# Patient Record
Sex: FEMALE | Race: WHITE | NOT HISPANIC OR LATINO
[De-identification: names, ages, dates, MRNs, and addresses within clinical notes are randomized per-mention and may not be internally consistent; named-entity substitution may affect disease eponyms.]

---

## 2019-03-22 PROBLEM — Z00.00 ENCOUNTER FOR PREVENTIVE HEALTH EXAMINATION: Status: ACTIVE | Noted: 2019-03-22

## 2019-03-26 ENCOUNTER — RECORD ABSTRACTING (OUTPATIENT)
Age: 64
End: 2019-03-26

## 2019-03-26 DIAGNOSIS — M77.9 ENTHESOPATHY, UNSPECIFIED: ICD-10-CM

## 2019-03-26 DIAGNOSIS — Z82.61 FAMILY HISTORY OF ARTHRITIS: ICD-10-CM

## 2019-03-26 DIAGNOSIS — Z85.850 PERSONAL HISTORY OF MALIGNANT NEOPLASM OF THYROID: ICD-10-CM

## 2019-03-26 DIAGNOSIS — M79.7 FIBROMYALGIA: ICD-10-CM

## 2019-03-26 DIAGNOSIS — M53.82 OTHER SPECIFIED DORSOPATHIES, CERVICAL REGION: ICD-10-CM

## 2019-03-26 DIAGNOSIS — M53.1 CERVICOBRACHIAL SYNDROME: ICD-10-CM

## 2019-03-26 DIAGNOSIS — M70.51 OTHER BURSITIS OF KNEE, RIGHT KNEE: ICD-10-CM

## 2019-03-26 DIAGNOSIS — M22.40 CHONDROMALACIA PATELLAE, UNSPECIFIED KNEE: ICD-10-CM

## 2019-03-26 DIAGNOSIS — G57.62 LESION OF PLANTAR NERVE, LEFT LOWER LIMB: ICD-10-CM

## 2019-03-26 DIAGNOSIS — Z78.9 OTHER SPECIFIED HEALTH STATUS: ICD-10-CM

## 2019-03-26 DIAGNOSIS — Z87.19 PERSONAL HISTORY OF OTHER DISEASES OF THE DIGESTIVE SYSTEM: ICD-10-CM

## 2019-03-26 DIAGNOSIS — M72.2 PLANTAR FASCIAL FIBROMATOSIS: ICD-10-CM

## 2019-03-26 DIAGNOSIS — Z80.9 FAMILY HISTORY OF MALIGNANT NEOPLASM, UNSPECIFIED: ICD-10-CM

## 2019-03-26 DIAGNOSIS — M23.90 UNSPECIFIED INTERNAL DERANGEMENT OF UNSPECIFIED KNEE: ICD-10-CM

## 2019-03-26 DIAGNOSIS — M75.52 BURSITIS OF LEFT SHOULDER: ICD-10-CM

## 2019-03-26 DIAGNOSIS — S93.609A UNSPECIFIED SPRAIN OF UNSPECIFIED FOOT, INITIAL ENCOUNTER: ICD-10-CM

## 2019-03-26 DIAGNOSIS — M25.579 PAIN IN UNSPECIFIED ANKLE AND JOINTS OF UNSPECIFIED FOOT: ICD-10-CM

## 2019-03-26 DIAGNOSIS — R25.2 CRAMP AND SPASM: ICD-10-CM

## 2019-03-26 DIAGNOSIS — Z87.440 PERSONAL HISTORY OF URINARY (TRACT) INFECTIONS: ICD-10-CM

## 2019-03-26 DIAGNOSIS — M25.529 PAIN IN UNSPECIFIED ELBOW: ICD-10-CM

## 2019-03-27 ENCOUNTER — APPOINTMENT (OUTPATIENT)
Dept: PHYSICAL MEDICINE AND REHAB | Facility: CLINIC | Age: 64
End: 2019-03-27
Payer: COMMERCIAL

## 2019-03-27 VITALS — HEIGHT: 63 IN | BODY MASS INDEX: 19.31 KG/M2 | WEIGHT: 109 LBS

## 2019-03-27 PROCEDURE — 99204 OFFICE O/P NEW MOD 45 MIN: CPT | Mod: 25

## 2019-03-27 PROCEDURE — 20552 NJX 1/MLT TRIGGER POINT 1/2: CPT

## 2019-03-27 RX ORDER — LEVOTHYROXINE SODIUM 75 UG/1
75 TABLET ORAL
Refills: 0 | Status: COMPLETED | COMMUNITY
End: 2019-03-27

## 2019-03-27 RX ORDER — CYCLOSPORINE 0.5 MG/ML
0.05 EMULSION OPHTHALMIC
Refills: 0 | Status: COMPLETED | COMMUNITY
End: 2019-03-27

## 2019-03-27 RX ORDER — IBUPROFEN 600 MG/1
600 TABLET, FILM COATED ORAL 3 TIMES DAILY
Refills: 0 | Status: COMPLETED | COMMUNITY
End: 2019-03-27

## 2019-03-27 RX ORDER — DICLOFENAC SODIUM 10 MG/G
1 GEL TOPICAL
Refills: 0 | Status: COMPLETED | COMMUNITY
End: 2019-03-27

## 2019-03-28 NOTE — HISTORY OF PRESENT ILLNESS
[FreeTextEntry1] : Patient has had pain in the right buttock and tightness in the hamstrings for a month. The pain is variable. It can be sharp, stabbing, and achy. Pain level ranges from 5 to 8. Aggravating factors include quick movements, walking fast, and sitting on a hard chair. Stretching alleviates the pain.

## 2019-03-28 NOTE — ASSESSMENT
[FreeTextEntry1] : Trigger point injection given to right glut, if no improvement patient may call back for Medrol Dose Neymar prescription to be sent

## 2019-03-28 NOTE — PROCEDURE
[de-identified] : Patient has demonstrated limited relief from NSAIDS, rest, and PT.\par  After discussion of the risks and benefits, she elected to proceed with a trigger point injection into the right gluteus sami. I confirmed no prior adverse reactions, no active infections, and no relevant allergies. \par \par The skin was prepped in the usual sterile manner. \par The sites were injected with local anesthetic followed by local needling. The injection was completed without complication and a bandage was applied. \par She tolerated the procedure well and was given post-injection instructions. \par \par Cold Tx x 48 hours, analgesics prn. Medications: 1 ml of 1% Lidocaine per site

## 2019-03-28 NOTE — PHYSICAL EXAM
[FreeTextEntry1] : Skin\par Lumbosacral Spine: normal skin\par \par Lumbar Spine\par Inspection: normal alignment\par Bony Palpation of the Lumbar Spine: no tenderness of the spinous process\par Bony Palpation of the Right Hip: tenderness of the sciatic notch, no tenderness of the SI joint\par Bony Palpation of the Left Hip: no tenderness of the sciatic notch, no tenderness of the SI joint\par Soft Tissue Palpation on the Right: tenderness on palpation of the lumbar paraspinals\par Soft Tissue Palpation on the Left: tenderness on palpation of the lumbar paraspinals\par Active Range of Motion: decreased ROM secondary to pain with flexion\par \par Motor Strength\par L2 Motor Strength on the Right: hip flexion iliopsoas 5/5\par L2 Motor Strength on the Left: hip flexion iliopsoas 5/5\par L3 Motor Strength on the Right: knee extension quadriceps 5/5\par L3 Motor Strength on the Left: knee extension quadriceps 5/5\par L4 Motor Strength on the Right: ankle dorsiflexion tibialis anterior 5/5\par L4 Motor Strength on the Left: ankle dorsiflexion tibialis anterior 5/5\par L5 Motor Strength on the Right: great toe extension extensor hallucis longus 5/5\par L5 Motor Strength on the Left: great toe extension extensor hallucis longus 5/5\par S1 Motor Strength on the Right: plantar flexion gastrocnemius 5/5\par S1 Motor Strength on the Left: plantar flexion gastrocnemius 5/5\par \par Neurological System\par Ankle Reflex Right: 2+\par Ankle Reflex Left: 2+\par Knee Reflex Right: 2+\par Knee Reflex Left: 2+\par Sensation on the Right: L1 normal, L2 normal, L3 normal, L4 normal, L5 normal, S1 normal\par Sensation on the Left: L1 normal, L2 normal, L3 normal, L4 normal, L5 normal, S1 normal\par Special Tests on the Right: compression test negative, SLR test positive, Femoral stretch test negative\par Special Tests on the Left: compression test negative, SLR test negative, Femoral stretch test negative\par \par Psychiatric\par Orientation: oriented to time, oriented to place, oriented to person\par Mood and Affect: active and alert\par  [Normal] : Oriented to person, place, and time, insight and judgement were intact and the affect was normal

## 2019-03-31 ENCOUNTER — MOBILE ON CALL (OUTPATIENT)
Age: 64
End: 2019-03-31

## 2019-03-31 RX ORDER — METHYLPREDNISOLONE 4 MG/1
4 TABLET ORAL
Qty: 1 | Refills: 0 | Status: ACTIVE | COMMUNITY
Start: 2019-03-31 | End: 1900-01-01

## 2019-04-03 ENCOUNTER — APPOINTMENT (OUTPATIENT)
Dept: ORTHOPEDIC SURGERY | Facility: CLINIC | Age: 64
End: 2019-04-03
Payer: COMMERCIAL

## 2019-04-03 VITALS — HEIGHT: 63 IN | BODY MASS INDEX: 19.31 KG/M2 | WEIGHT: 109 LBS

## 2019-04-03 DIAGNOSIS — M25.572 PAIN IN LEFT ANKLE AND JOINTS OF LEFT FOOT: ICD-10-CM

## 2019-04-03 PROCEDURE — 73630 X-RAY EXAM OF FOOT: CPT | Mod: LT

## 2019-04-03 PROCEDURE — 99214 OFFICE O/P EST MOD 30 MIN: CPT

## 2019-04-03 NOTE — DISCUSSION/SUMMARY
[de-identified] : It was recommended that she ice the area and use over-the-counter medications as needed. She does have a pair of orthotics that do not seem to be supporting her arch as well as they could. I have recommended trying to use some moleskin to increase the amount of arch support. She will give this 2 or 3 weeks. If the pain does not improve with this then the consideration might be needed of an MRI of the midfoot to upper part of the ankle.

## 2019-04-03 NOTE — HISTORY OF PRESENT ILLNESS
[Sneakers] : juan pablo [FreeTextEntry1] : left foot pain\par 2 months since onset and worsening\par Atraumatic\par Aggravating factors: Walking, ROM, Tip Toeing while dancing, Weight bearing, stairs\par Alleviating factors: OTC,ICE\par Associated Symptoms: Stiffness, Instability, Weakness

## 2019-04-03 NOTE — PHYSICAL EXAM
[de-identified] : Left foot examination shows no evidence of warmth or swelling. She does have a moderate amount of pronation and with a mildly reduced arch. She does pronate when she walks. She is able to walk on her toes and heels. She has 5/5 strength in he'll raises. Range of motion is normal. Neurovascular exam is normal. There is tenderness just inferior to the medial joint line. [de-identified] : X-ray of the left foot was normal.

## 2019-05-29 ENCOUNTER — APPOINTMENT (OUTPATIENT)
Dept: PHYSICAL MEDICINE AND REHAB | Facility: CLINIC | Age: 64
End: 2019-05-29
Payer: COMMERCIAL

## 2019-05-29 VITALS — WEIGHT: 109 LBS | HEIGHT: 63 IN | BODY MASS INDEX: 19.31 KG/M2

## 2019-05-29 PROCEDURE — 99213 OFFICE O/P EST LOW 20 MIN: CPT

## 2019-05-29 NOTE — HISTORY OF PRESENT ILLNESS
[FreeTextEntry1] : Patient is following up on low back pain radiating into glut. Pain is now also on the left side and worse after prolonged sitting. She feels the pain when she walks and stretches the hamstrings. Pain level at its worst is 7/10. Medrol evelyn helped temporarily.

## 2019-05-29 NOTE — PHYSICAL EXAM
[FreeTextEntry1] : Skin\par Lumbosacral Spine: normal skin\par \par Lumbar Spine\par Inspection: normal alignment\par Bony Palpation of the Lumbar Spine: no tenderness of the spinous process\par Bony Palpation of the Right Hip: tenderness of the sciatic notch, no tenderness of the SI joint\par Bony Palpation of the Left Hip: no tenderness of the sciatic notch, no tenderness of the SI joint\par Soft Tissue Palpation on the Right: tenderness on palpation of the lumbar paraspinals\par Soft Tissue Palpation on the Left: tenderness on palpation of the lumbar paraspinals\par Active Range of Motion: decreased ROM secondary to pain with flexion\par \par Motor Strength\par L2 Motor Strength on the Right: hip flexion iliopsoas 5/5\par L2 Motor Strength on the Left: hip flexion iliopsoas 5/5\par L3 Motor Strength on the Right: knee extension quadriceps 5/5\par L3 Motor Strength on the Left: knee extension quadriceps 5/5\par L4 Motor Strength on the Right: ankle dorsiflexion tibialis anterior 5/5\par L4 Motor Strength on the Left: ankle dorsiflexion tibialis anterior 5/5\par L5 Motor Strength on the Right: great toe extension extensor hallucis longus 5/5\par L5 Motor Strength on the Left: great toe extension extensor hallucis longus 5/5\par S1 Motor Strength on the Right: plantar flexion gastrocnemius 5/5\par S1 Motor Strength on the Left: plantar flexion gastrocnemius 5/5\par \par Neurological System\par Ankle Reflex Right: 2+\par Ankle Reflex Left: 2+\par Knee Reflex Right: 2+\par Knee Reflex Left: 2+\par Sensation on the Right: L1 normal, L2 normal, L3 normal, L4 normal, L5 normal, S1 normal\par Sensation on the Left: L1 normal, L2 normal, L3 normal, L4 normal, L5 normal, S1 normal\par Special Tests on the Right: compression test negative, SLR test positive, Femoral stretch test negative\par Special Tests on the Left: compression test negative, SLR test negative, Femoral stretch test negative\par \par Psychiatric\par Orientation: oriented to time, oriented to place, oriented to person\par Mood and Affect: active and alert\par  [Normal] : Deep tendon reflexes were 2+ and symmetric, the sensory exam was normal to light touch and pinprick and no focal deficits

## 2019-05-29 NOTE — ASSESSMENT
[FreeTextEntry1] : Tried NSAIDs, PT in past.. Pain returned, will get MRI of L spine, follow up after MRI. May need epidural injection. \par \par

## 2019-06-17 ENCOUNTER — APPOINTMENT (OUTPATIENT)
Dept: PHYSICAL MEDICINE AND REHAB | Facility: CLINIC | Age: 64
End: 2019-06-17
Payer: COMMERCIAL

## 2019-06-17 VITALS — HEIGHT: 63 IN | WEIGHT: 109 LBS | BODY MASS INDEX: 19.31 KG/M2

## 2019-06-17 PROCEDURE — 99214 OFFICE O/P EST MOD 30 MIN: CPT

## 2019-06-18 NOTE — ASSESSMENT
[FreeTextEntry1] : No improvement after PT and oral anti-inflammatories despite over 6 weeks of conservative management Based on current symptoms will schedule for Caudal PAULINE as patient is having bilateral symptoms. Consider facet mediated pain as source of pain as well in differentials if no improvement after epidural.

## 2019-06-18 NOTE — HISTORY OF PRESENT ILLNESS
[FreeTextEntry1] : PATIENT IS FOLLOWING UP TO DISCUSS MRI RESULTS. SHE STATES NO CHANGE IN SYMPTOMS SINCE LAST VISIT. SHE HAS PAIN WHEN SITTING AND WALKING. SHE RATES PAIN 7/10.

## 2019-07-10 ENCOUNTER — APPOINTMENT (OUTPATIENT)
Dept: PHYSICAL MEDICINE AND REHAB | Facility: CLINIC | Age: 64
End: 2019-07-10
Payer: COMMERCIAL

## 2019-07-10 PROCEDURE — 62323 NJX INTERLAMINAR LMBR/SAC: CPT

## 2019-07-18 ENCOUNTER — APPOINTMENT (OUTPATIENT)
Dept: PHYSICAL MEDICINE AND REHAB | Facility: CLINIC | Age: 64
End: 2019-07-18
Payer: COMMERCIAL

## 2019-07-18 VITALS — HEIGHT: 63 IN | WEIGHT: 109 LBS | BODY MASS INDEX: 19.31 KG/M2

## 2019-07-18 PROCEDURE — 99213 OFFICE O/P EST LOW 20 MIN: CPT

## 2019-07-18 NOTE — ASSESSMENT
[FreeTextEntry1] : IMPROVED AFTER PAULINE, HAVING SOME RIGHT SIDED PAIN NOW HOWEVER HOLD OFF ON REPEATING EPIDURAL. START PT AND CONSIDER RIGHT L5/S1 ILESI IF SHE CONTINUES TO HAVE RIGHT SIDED SYMPTOMS. FOLLOW UP IN 4-6 WEEKS.

## 2019-07-18 NOTE — HISTORY OF PRESENT ILLNESS
[FreeTextEntry1] : PATIENT IS FOLLOWING UP ON LOWER BACK PAIN. PATIENT STATES SHE HAS BEEN FEELING PAIN IN RIGHT SIDE OF LOWER BACK THAT RADIATES DOWN TO BACK OF THE LEG.  FEELS BETTER WHEN SHE IS NOT SITTING. PATIENT TOOK IBUPROFEN FOR A FEW DAYS FOR PAIN RELIEF BUT HAD TO STOP BECAUSE IT WAS CAUSING HER STOMACH ISSUES. CUREENT PAIN LEVEL 3/10.

## 2019-07-18 NOTE — PHYSICAL EXAM
[FreeTextEntry1] : Skin\par Lumbosacral Spine: normal skin\par \par Lumbar Spine\par Inspection: normal alignment\par Bony Palpation of the Lumbar Spine: no tenderness of the spinous process\par Bony Palpation of the Right Hip: tenderness of the sciatic notch, no tenderness of the SI joint\par Bony Palpation of the Left Hip: no tenderness of the sciatic notch, no tenderness of the SI joint\par Soft Tissue Palpation on the Right: tenderness on palpation of the lumbar paraspinals\par Soft Tissue Palpation on the Left: tenderness on palpation of the lumbar paraspinals\par Active Range of Motion: decreased ROM secondary to pain with flexion\par \par Motor Strength\par L2 Motor Strength on the Right: hip flexion iliopsoas 5/5\par L2 Motor Strength on the Left: hip flexion iliopsoas 5/5\par L3 Motor Strength on the Right: knee extension quadriceps 5/5\par L3 Motor Strength on the Left: knee extension quadriceps 5/5\par L4 Motor Strength on the Right: ankle dorsiflexion tibialis anterior 5/5\par L4 Motor Strength on the Left: ankle dorsiflexion tibialis anterior 5/5\par L5 Motor Strength on the Right: great toe extension extensor hallucis longus 5/5\par L5 Motor Strength on the Left: great toe extension extensor hallucis longus 5/5\par S1 Motor Strength on the Right: plantar flexion gastrocnemius 5/5\par S1 Motor Strength on the Left: plantar flexion gastrocnemius 5/5\par \par Neurological System\par Ankle Reflex Right: 2+\par Ankle Reflex Left: 2+\par Knee Reflex Right: 2+\par Knee Reflex Left: 2+\par Sensation on the Right: L1 normal, L2 normal, L3 normal, L4 normal, L5 normal, S1 normal\par Sensation on the Left: L1 normal, L2 normal, L3 normal, L4 normal, L5 normal, S1 normal\par Special Tests on the Right: compression test negative, SLR test positive, Femoral stretch test negative\par Special Tests on the Left: compression test negative, SLR test negative, Femoral stretch test negative\par \par Psychiatric\par Orientation: oriented to time, oriented to place, oriented to person\par Mood and Affect: active and alert\par

## 2020-01-08 ENCOUNTER — APPOINTMENT (OUTPATIENT)
Dept: PHYSICAL MEDICINE AND REHAB | Facility: CLINIC | Age: 65
End: 2020-01-08
Payer: COMMERCIAL

## 2020-01-08 VITALS — HEIGHT: 63 IN | BODY MASS INDEX: 19.31 KG/M2 | WEIGHT: 109 LBS

## 2020-01-08 PROCEDURE — 99213 OFFICE O/P EST LOW 20 MIN: CPT

## 2020-01-09 NOTE — PHYSICAL EXAM
[FreeTextEntry1] : \par  [Normal] : Oriented to person, place, and time, insight and judgement were intact and the affect was normal [de-identified] : No gross deformity, TTP of the lumbar paraspinals: bilaterally, ROM limited with flexion, SLR positive: left

## 2020-01-09 NOTE — HISTORY OF PRESENT ILLNESS
[FreeTextEntry1] : Patient reports follow up in low back pain. Pain was a 7/10 last week until yesterday which was a 5/10, today 3/10. Using Ice, had PAULINE done in July 2019. Pain radiates to the left leg.

## 2020-01-10 ENCOUNTER — TRANSCRIPTION ENCOUNTER (OUTPATIENT)
Age: 65
End: 2020-01-10

## 2020-02-26 ENCOUNTER — APPOINTMENT (OUTPATIENT)
Dept: PHYSICAL MEDICINE AND REHAB | Facility: CLINIC | Age: 65
End: 2020-02-26
Payer: MEDICARE

## 2020-02-26 VITALS — WEIGHT: 109 LBS | HEIGHT: 63 IN | BODY MASS INDEX: 19.31 KG/M2

## 2020-02-26 PROCEDURE — 99214 OFFICE O/P EST MOD 30 MIN: CPT | Mod: 25

## 2020-02-26 PROCEDURE — 20553 NJX 1/MLT TRIGGER POINTS 3/>: CPT

## 2020-02-26 RX ORDER — CYCLOBENZAPRINE HYDROCHLORIDE 10 MG/1
10 TABLET, FILM COATED ORAL
Qty: 15 | Refills: 0 | Status: ACTIVE | COMMUNITY
Start: 2020-02-26 | End: 1900-01-01

## 2020-02-27 NOTE — HISTORY OF PRESENT ILLNESS
[FreeTextEntry1] : Patient is following up on back pain.Left side is worse. Has been in PT for 6 weeks but still no improvement.Has trouble sitting ,standing, walking.  Current pain level 6/10.

## 2020-02-27 NOTE — PROCEDURE
[de-identified] : Patient has demonstrated limited relief from NSAIDS and rest. After discussion of the risks and benefits, the patient elected to proceed with a trigger point injection into the \par \par \par Left gluteus medius, sami and L5 paraspinal\par \par \par I confirmed no prior adverse reactions, no active infections, and no relevant allergies. The skin was prepped in the usual sterile manner. \par The sites were injected with local anesthetic followed by local needling. The injection was completed without complication and a bandage was applied. The patient tolerated the procedure well and was given post-injection instructions. \par \par RecommendL Cold Tx x 48 hours, analgesics prn. \par \par Medications used: 1 ml of 1% Lidocaine per site\par

## 2020-02-27 NOTE — ASSESSMENT
[FreeTextEntry1] : Will try cyclobenzaprine first, If no improve after trigger point and cyclobenzaprine, patient will call, ,may need Medrol dose Neymar\par \par If still no improvement, PAULINE, then SI joint, then MBB/RFA.

## 2020-04-15 ENCOUNTER — APPOINTMENT (OUTPATIENT)
Dept: PHYSICAL MEDICINE AND REHAB | Facility: CLINIC | Age: 65
End: 2020-04-15
Payer: MEDICARE

## 2020-04-15 PROCEDURE — 99214 OFFICE O/P EST MOD 30 MIN: CPT | Mod: 95

## 2020-04-17 NOTE — PHYSICAL EXAM
[Antalgic Gait Left] : antalgic on the left [Involuntary Movements] : no involuntary movements were seen [Normal] : Oriented to person, place, and time, insight and judgement were intact and the affect was normal [de-identified] : Sclera normal, EOMI [de-identified] : No cyanosis [de-identified] : No edema [de-identified] : No gross deformity, TTP of the lumbar paraspinals: left (patient palpated), ROM limited with flexion, SLR positive: left

## 2020-04-17 NOTE — ASSESSMENT
[FreeTextEntry1] : Left sided symptoms, will try Gabapentin 300 mg qhs, titrate up as tolerated to TID. If no improvement in  a week consider Medrol Dose Neymar (risks/benefits discussed).

## 2020-04-17 NOTE — HISTORY OF PRESENT ILLNESS
[Home] : at home, [unfilled] , at the time of the visit. [Other Location: e.g. Home (Enter Location, City,State)___] : at [unfilled] [FreeTextEntry1] : Patient is following up on back pain. Had been to PT with improvement. However a few weeks ago pain returned and few days ago was a 8-9/10. Pain is on left side and radiates to hamstrings. .Has trouble sitting ,standing, walking.  Current pain level 6/10.

## 2020-04-24 ENCOUNTER — APPOINTMENT (OUTPATIENT)
Dept: PHYSICAL MEDICINE AND REHAB | Facility: CLINIC | Age: 65
End: 2020-04-24
Payer: MEDICARE

## 2020-04-24 PROCEDURE — 99213 OFFICE O/P EST LOW 20 MIN: CPT | Mod: 95

## 2020-04-28 NOTE — PHYSICAL EXAM
[FreeTextEntry1] : \par  [Antalgic Gait Left] : antalgic on the left [Involuntary Movements] : no involuntary movements were seen [Normal] : Oriented to person, place, and time, insight and judgement were intact and the affect was normal [de-identified] : Sclera normal, EOMI [de-identified] : No cyanosis [de-identified] : No edema [de-identified] : No gross deformity, TTP of the lumbar paraspinals: left (patient palpated), ROM limited with flexion, SLR positive: left

## 2020-04-28 NOTE — ASSESSMENT
[FreeTextEntry1] : Gabapentin did not help but she found Aleve helpful. Recommend continuing this if pain returns. Follow up prn.

## 2020-04-28 NOTE — HISTORY OF PRESENT ILLNESS
[Home] : at home, [unfilled] , at the time of the visit. [Other Location: e.g. Home (Enter Location, City,State)___] : at [unfilled] [Patient] : the patient [Self] : self [FreeTextEntry1] : No improvement with Gabapentin but Aleve appeared to help. Doing overall better now.

## 2020-05-06 ENCOUNTER — RX RENEWAL (OUTPATIENT)
Age: 65
End: 2020-05-06

## 2020-09-16 ENCOUNTER — APPOINTMENT (OUTPATIENT)
Dept: ORTHOPEDIC SURGERY | Facility: CLINIC | Age: 65
End: 2020-09-16
Payer: MEDICARE

## 2020-09-16 DIAGNOSIS — M25.552 PAIN IN LEFT HIP: ICD-10-CM

## 2020-09-16 PROCEDURE — 72110 X-RAY EXAM L-2 SPINE 4/>VWS: CPT

## 2020-09-16 PROCEDURE — 73502 X-RAY EXAM HIP UNI 2-3 VIEWS: CPT | Mod: LT

## 2020-09-16 PROCEDURE — 99214 OFFICE O/P EST MOD 30 MIN: CPT

## 2020-09-16 RX ORDER — DICLOFENAC SODIUM 75 MG/1
75 TABLET, DELAYED RELEASE ORAL
Qty: 60 | Refills: 0 | Status: ACTIVE | COMMUNITY
Start: 2020-09-16 | End: 1900-01-01

## 2020-09-20 NOTE — PHYSICAL EXAM
[de-identified] : General: No acute distress, conversant, well-nourished.\par Head: Normocephalic, atraumatic\par Neck: trachea midline, FROM\par Heart: normotensive and normal rate and rhythm\par Lungs: No labored breathing\par Skin: No abrasions, no rashes, no edema\par Psych: Alert and oriented to person, place and time\par Extremities: no peripheral edema or digital cyanosis\par Gait: Normal gait. Can perform tandem gait.  \par Vascular: warm and well perfused distally, palpable distal pulses\par \par MSK:\par Left hip: skin intact, no erythema or swelling\par no tenderness to palpation\par mild discomfort at extremes of pROM\par SILT m/ll/1st DWS\par +motor EHL/FHL/TA\par WWP distally, palpable DP and PT pulses\par \par Lumbar spine:\par No tenderness to palpation.  No step-off, no deformity.\par \par NEURO EXAM:\par Sensation \par Left L2  -  2/2            \par Left L3  -  2/2\par Left L4  -  2/2\par Left L5  -  2/2\par Left S1  -  2/2\par \par Right L2  -  2/2            \par Right L3  -  2/2\par Right L4  -  2/2\par Right L5  -  2/2\par Right S1  -  2/2\par \par Motor: \par Left L2 (hip flexion)                            5/5                \par Left L3 (knee extension)                   5/5                \par Left L4 (ankle dorsiflexion)                 5/5                \par Left L5 (long toe extensor)                5/5                \par Left S1 (ankle plantar flexion)           5/5\par \par Right L2 (hip flexion)                            5/5                \par Right L3 (knee extension)                   5/5                \par Right L4 (ankle dorsiflexion)                 5/5                \par Right L5 (long toe extensor)                5/5                \par Right S1 (ankle plantar flexion)           5/5\par \par Reflexes: Normal and symmetric\par Negative clonus.  Down-going Babinski.   [de-identified] : Lumbar AP, lateral, flexion and extension radiographs obtained in the office today shows no fracture or dislocation. Lumbar spondylosis with loss of disc height.  L2-L3 mild retrolisthesis.\par \par Left hip AP and lateral radiographs obtained in the office today shows no fracture or dislocation.  Mild age-related degenerative changes.\par \par Lumbar MRI (6/8/19): Small right foraminal disc protrusion L5-S1. Small left extraforaminal disc protrusion at L3-L4.  Small right extraforaminal disc protrusion at L1-L2 with nerve compression.

## 2020-09-20 NOTE — ASSESSMENT
[FreeTextEntry1] : 65 year old female with long-standing low back pain and lumbar radiculopathy.  She has paresthesias but is otherwise neurologically intact.  Her symptoms have acutely worsened over the last several weeks.  She has failed to respond to PT, medications, and spinal injections.  She will be sent for a new lumbar MRI.  She will followup once her MRI has been completed.  She was also given a prescription for diclofenac.  She knows to call with any questions or concerns or if her symptoms acutely worsen.

## 2020-09-20 NOTE — HISTORY OF PRESENT ILLNESS
[de-identified] : 65 year old female with low back pain radiating into her left gluteal region and posterior thigh.  She has previously been managed by Dr. King but over the last several weeks the pain has progressively worsened.  She has tried PT without relief. She had a lumbar epidural which provided minimal and short-lasting relief.  She has tried various medications including NSAIDs and gabapentin without relief.  Rest helps.  Activity makes it worse.  She reports paresthesias and numbness.  She denies weakness, balance problems, urinary retention or fecal incontinence.

## 2020-09-30 ENCOUNTER — APPOINTMENT (OUTPATIENT)
Dept: ORTHOPEDIC SURGERY | Facility: CLINIC | Age: 65
End: 2020-09-30
Payer: MEDICARE

## 2020-09-30 ENCOUNTER — APPOINTMENT (OUTPATIENT)
Dept: PHYSICAL MEDICINE AND REHAB | Facility: CLINIC | Age: 65
End: 2020-09-30
Payer: MEDICARE

## 2020-09-30 VITALS — TEMPERATURE: 96.5 F

## 2020-09-30 DIAGNOSIS — M25.551 PAIN IN RIGHT HIP: ICD-10-CM

## 2020-09-30 PROCEDURE — 99213 OFFICE O/P EST LOW 20 MIN: CPT

## 2020-09-30 PROCEDURE — 73502 X-RAY EXAM HIP UNI 2-3 VIEWS: CPT | Mod: RT

## 2020-09-30 PROCEDURE — 99214 OFFICE O/P EST MOD 30 MIN: CPT

## 2020-09-30 NOTE — PHYSICAL EXAM
[FreeTextEntry1] : SEEMA is a 65 year female \par Constitutional: healthy appearing, NAD, and normal body habitus\par \par LUMBAR\par ROM: flexion to 30 deg, ext to 5 deg\par \par Gait: normal\par \par Inspection: no erythema, warmth\par Spine: no TTP in spinous process, SI joint, sacrum\par Bony palpation: no TTP in GT\par \par Soft tissue palpation hip: no TTP in gluteus sami, medius\par Soft tissue palpation of spine: no TTP in lumbar paraspinals\par \par 5/5 bilateral HF, KE, DF, PF \par sensation intact in bilat LE\par reflexes: knee and ankle 0 bilat\par \par Special tests: neg seated SLR\par \par

## 2020-09-30 NOTE — HISTORY OF PRESENT ILLNESS
[FreeTextEntry1] : Location: back\par Quality: sharp\par Severity: 5/10\par Duration: over 1 yr\par Timing: chronic\par Aggravating Factors: standing on left leg, sitting\par Alleviating Factors: PAULINE\par Associated Symptoms: denies weight loss, fever, chills, change in bowel/bladder habits, redness, warmth, weakness, numbness/tingling, +radiation down left leg sometimes\par Prior Studies: MRI\par

## 2020-09-30 NOTE — ASSESSMENT
[FreeTextEntry1] : MRI reviewed.  Will do left MBB to see if there is facetogenic component.  Consider repeat PAULINE. \par \par Taught hip abd.

## 2020-10-03 PROBLEM — M25.551 RIGHT HIP PAIN: Status: ACTIVE | Noted: 2020-10-03

## 2020-10-07 ENCOUNTER — APPOINTMENT (OUTPATIENT)
Dept: PHYSICAL MEDICINE AND REHAB | Facility: CLINIC | Age: 65
End: 2020-10-07
Payer: MEDICARE

## 2020-10-07 VITALS — TEMPERATURE: 96.7 F

## 2020-10-07 PROCEDURE — 64483 NJX AA&/STRD TFRM EPI L/S 1: CPT | Mod: LT

## 2020-10-13 NOTE — PHYSICAL EXAM
[de-identified] : General: No acute distress, conversant, well-nourished.\par Head: Normocephalic, atraumatic\par Neck: trachea midline, FROM\par Heart: normotensive and normal rate and rhythm\par Lungs: No labored breathing\par Skin: No abrasions, no rashes, no edema\par Psych: Alert and oriented to person, place and time\par Extremities: no peripheral edema or digital cyanosis\par Gait: Normal gait. Can perform tandem gait.  \par Vascular: warm and well perfused distally, palpable distal pulses\par \par MSK:\par Left hip: skin intact, no erythema or swelling\par no tenderness to palpation\par mild discomfort at extremes of pROM\par SILT m/ll/1st DWS\par +motor EHL/FHL/TA\par WWP distally, palpable DP and PT pulses\par \par Right hip: skin intact, no erythema or swelling\par no tenderness to palpation\par mild discomfort at extremes of pROM\par SILT m/ll/1st DWS\par +motor EHL/FHL/TA\par WWP distally, palpable DP and PT pulses\par \par Lumbar spine:\par No tenderness to palpation.  No step-off, no deformity.\par \par NEURO EXAM:\par Sensation \par Left L2  -  2/2            \par Left L3  -  2/2\par Left L4  -  2/2\par Left L5  -  2/2\par Left S1  -  2/2\par \par Right L2  -  2/2            \par Right L3  -  2/2\par Right L4  -  2/2\par Right L5  -  2/2\par Right S1  -  2/2\par \par Motor: \par Left L2 (hip flexion)                            5/5                \par Left L3 (knee extension)                   5/5                \par Left L4 (ankle dorsiflexion)                 5/5                \par Left L5 (long toe extensor)                5/5                \par Left S1 (ankle plantar flexion)           5/5\par \par Right L2 (hip flexion)                            5/5                \par Right L3 (knee extension)                   5/5                \par Right L4 (ankle dorsiflexion)                 5/5                \par Right L5 (long toe extensor)                5/5                \par Right S1 (ankle plantar flexion)           5/5\par \par Reflexes: Normal and symmetric\par Negative clonus.  Down-going Babinski.   [de-identified] : Right hip AP and lateral (2 views) radiographs obtained in the office today shows no fracture or dislocation.  Mild age-related degenerative changes.\par \par Lumbar MRI (9/23/20):  Left L5-S1 disc herniation impinging left S1 nerve root and with lateral recess stenosis.  Small right foraminal disc herniation.  \par \par Lumbar AP, lateral, flexion and extension radiographs (9/16/20) shows no fracture or dislocation. Lumbar spondylosis with loss of disc height.  L2-L3 mild retrolisthesis.\par \par Left hip AP and lateral radiographs (9/16/20) shows no fracture or dislocation.  Mild age-related degenerative changes.\par \par Lumbar MRI (6/8/19): Small right foraminal disc protrusion L5-S1. Small left extraforaminal disc protrusion at L3-L4.  Small right extraforaminal disc protrusion at L1-L2 with nerve compression.

## 2020-10-13 NOTE — ASSESSMENT
[FreeTextEntry1] : 65 year old female with low back pain and left lumbar radiculopathy. She also has paresthesias into the left leg.  Her new MRI shows interval development of L5-S1 HNP causing left lateral recess stenosis and impinging left S1 nerve root.  The patient would like to continue with injections and conservative treatment.  She says the back pain is what bothers her the most.  She will followup after she receives injections by Dr. Quinonez.  She knows to call with any questions or concerns or if her symptoms acutely worsen.

## 2020-10-13 NOTE — HISTORY OF PRESENT ILLNESS
[de-identified] : 65 year old female with low back pain and left lumbar radiculopathy.  The pain radiates into her left gluteal region and posterior thigh.  She reports paresthesias and numbness.  She says the Diclofenac did not help much.  She denies weakness, balance problems, urinary retention or fecal incontinence.  She is here to review her recent lumbar MRI. She also reports new right hip pain.

## 2020-10-14 ENCOUNTER — APPOINTMENT (OUTPATIENT)
Dept: PHYSICAL MEDICINE AND REHAB | Facility: CLINIC | Age: 65
End: 2020-10-14
Payer: MEDICARE

## 2020-10-14 DIAGNOSIS — M47.817 SPONDYLOSIS W/OUT MYELOPATHY OR RADICULOPATHY, LUMBOSACRAL REGION: ICD-10-CM

## 2020-10-14 PROCEDURE — 64493 INJ PARAVERT F JNT L/S 1 LEV: CPT | Mod: LT

## 2020-10-14 PROCEDURE — 64494 INJ PARAVERT F JNT L/S 2 LEV: CPT | Mod: LT

## 2020-11-04 ENCOUNTER — APPOINTMENT (OUTPATIENT)
Dept: PHYSICAL MEDICINE AND REHAB | Facility: CLINIC | Age: 65
End: 2020-11-04
Payer: MEDICARE

## 2020-11-04 DIAGNOSIS — M53.3 SACROCOCCYGEAL DISORDERS, NOT ELSEWHERE CLASSIFIED: ICD-10-CM

## 2020-11-04 PROCEDURE — 27096 INJECT SACROILIAC JOINT: CPT | Mod: LT

## 2020-11-18 ENCOUNTER — APPOINTMENT (OUTPATIENT)
Dept: PHYSICAL MEDICINE AND REHAB | Facility: CLINIC | Age: 65
End: 2020-11-18
Payer: MEDICARE

## 2020-11-18 PROCEDURE — 64484 NJX AA&/STRD TFRM EPI L/S EA: CPT | Mod: LT

## 2020-11-18 PROCEDURE — 64483 NJX AA&/STRD TFRM EPI L/S 1: CPT | Mod: LT

## 2020-12-11 ENCOUNTER — APPOINTMENT (OUTPATIENT)
Dept: PHYSICAL MEDICINE AND REHAB | Facility: CLINIC | Age: 65
End: 2020-12-11
Payer: MEDICARE

## 2020-12-11 VITALS — TEMPERATURE: 96.1 F

## 2020-12-11 PROCEDURE — 62323 NJX INTERLAMINAR LMBR/SAC: CPT

## 2020-12-21 ENCOUNTER — APPOINTMENT (OUTPATIENT)
Dept: PHYSICAL MEDICINE AND REHAB | Facility: CLINIC | Age: 65
End: 2020-12-21
Payer: MEDICARE

## 2020-12-21 PROCEDURE — 99213 OFFICE O/P EST LOW 20 MIN: CPT | Mod: 95

## 2020-12-21 RX ORDER — FLUTICASONE PROPIONATE 50 UG/1
50 SPRAY, METERED NASAL
Qty: 16 | Refills: 0 | Status: ACTIVE | COMMUNITY
Start: 2020-11-18

## 2020-12-21 NOTE — HISTORY OF PRESENT ILLNESS
[Home] : at home, [unfilled] , at the time of the visit. [Medical Office: ([facility])___] : at the medical office located in  [Verbal consent obtained from patient] : the patient, [unfilled] [FreeTextEntry1] : Pain was really mild after PAULINE for first 4 days but pain has come back.  Pain in front of left thigh also was better for a while.  She has been having some constipation and had to take a laxative.  She had a colonoscopy a wk before PAULINE.  Tiger balm helps along with heat.  No numbness.  Having some cramps in right calf.  \par 10/7/20 left L5 nerve root block\par 10/14/20 left L3, L4, L5 Medial branch block\par 11/4/20 Left SI joint injection\par 11/18/20 left L5 and S1 PAULINE\par 12/11/20 caudal PAULINE

## 2020-12-21 NOTE — PHYSICAL EXAM
[FreeTextEntry1] : 64 yo F in NAD\par \par normal gait\par can stand on toes\par can do squats\par no numbness in legs

## 2020-12-21 NOTE — ASSESSMENT
[FreeTextEntry1] : Stretch calf daily to prevent cramps.  Get back to doing squats.  She does not want surgery yet.

## 2020-12-29 ENCOUNTER — APPOINTMENT (OUTPATIENT)
Dept: PHYSICAL MEDICINE AND REHAB | Facility: CLINIC | Age: 65
End: 2020-12-29
Payer: MEDICARE

## 2020-12-29 VITALS — TEMPERATURE: 97.9 F

## 2020-12-29 DIAGNOSIS — M17.12 UNILATERAL PRIMARY OSTEOARTHRITIS, LEFT KNEE: ICD-10-CM

## 2020-12-29 PROCEDURE — 99214 OFFICE O/P EST MOD 30 MIN: CPT | Mod: 25

## 2020-12-29 PROCEDURE — 20552 NJX 1/MLT TRIGGER POINT 1/2: CPT

## 2020-12-29 PROCEDURE — 73562 X-RAY EXAM OF KNEE 3: CPT | Mod: LT

## 2020-12-29 NOTE — PHYSICAL EXAM
[FreeTextEntry1] : SEEMA is a 65 year female \par Constitutional: healthy appearing, NAD, and normal body habitus\par \par LUMBAR\par ROM: flexion to 30 deg, ext to 5 deg\par \par Gait: normal\par \par Inspection: no erythema, warmth\par Spine: no TTP in spinous process, SI joint, sacrum\par Bony palpation: no TTP in GT\par \par Soft tissue palpation hip: no TTP in gluteus sami, TTP in left glute medius, TTP in left quad\par Soft tissue palpation of spine: no TTP in lumbar paraspinals\par \par 5/5 bilateral HF, KE, DF, PF \par sensation intact in bilat LE\par \par Special tests: neg seated SLR\par \par left knee:\par no swelling, erythema, warmth\par flexion to 120 deg\par TTP in lateral joint

## 2020-12-29 NOTE — ASSESSMENT
[FreeTextEntry1] : Try Pennsaid for knee.  Discussed injections.  She will think about refresher course of PT.

## 2020-12-29 NOTE — HISTORY OF PRESENT ILLNESS
[FreeTextEntry1] : Location: back pain\par Severity: 3/10\par Duration: over 1 yr\par Aggravating Factors:\par Alleviating Factors: PAULINE\par Associated Symptoms: denies weight loss, fever, chills, change in bowel/bladder habits, redness, warmth, weakness, numbness/tingling, +radiation down left thigh\par Prior Studies: MRI\par 10/7/20 left L5 nerve root block\par 10/14/20 left L3, L4, L5 Medial branch block\par 11/4/20 Left SI joint injection\par 11/18/20 left L5 and S1 PAULINE\par 12/11/20 caudal PAULINE \par \par Left hurting hurting for years.  Pain in lateral knee.  No swelling, erythema, warmth. Pain with standing, stairs.

## 2020-12-29 NOTE — DATA REVIEWED
[Plain X-Rays] : plain X-Rays [FreeTextEntry1] : Office x-rays of the left knee AP lateral and sunrise show mild lateral arthritis, shallow femoral groove

## 2020-12-29 NOTE — PROCEDURE
[de-identified] : Trigger Point Tx\par After cleaning with alcohol, sterile needles were inserted into left Ub 25 to 26, gluteus medius, and quad in 2 spots and manipulated intermittently followed by injection of Lidocaine. The needles were then removed. Hemostasis was achieved immediately. She  tolerated the procedure well and was given discharge instructions and then discharged to home without any complaints or complications.\par \par

## 2021-01-06 ENCOUNTER — APPOINTMENT (OUTPATIENT)
Dept: PHYSICAL MEDICINE AND REHAB | Facility: CLINIC | Age: 66
End: 2021-01-06
Payer: MEDICARE

## 2021-01-06 PROCEDURE — 99213 OFFICE O/P EST LOW 20 MIN: CPT | Mod: 25

## 2021-01-06 PROCEDURE — 20552 NJX 1/MLT TRIGGER POINT 1/2: CPT

## 2021-01-06 RX ORDER — GABAPENTIN 300 MG/1
300 CAPSULE ORAL 3 TIMES DAILY
Qty: 90 | Refills: 0 | Status: DISCONTINUED | COMMUNITY
Start: 2020-04-15 | End: 2021-01-06

## 2021-01-06 NOTE — HISTORY OF PRESENT ILLNESS
[FreeTextEntry1] : Location: back pain\par Severity: 4/10\par Duration: over 1 yr\par Aggravating Factors: sitting\par Alleviating Factors: PAULINE\par Associated Symptoms: denies weight loss, fever, chills, change in bowel/bladder habits, redness, warmth, weakness, numbness/tingling, +radiation down left thigh, went to left big toe Sunday\par Prior Studies: MRI\par 10/7/20 left L5 nerve root block\par 10/14/20 left L3, L4, L5 Medial branch block\par 11/4/20 Left SI joint injection\par 11/18/20 left L5 and S1 PAULINE\par 12/11/20 caudal PAULINE \par \par did not tolerate Gabapentin

## 2021-01-06 NOTE — PROCEDURE
[de-identified] : Trigger Point Tx\par After cleaning with alcohol, sterile needles were inserted into left Ub 25 to 26, gluteus medius and sami, and quad in 2 spots and manipulated intermittently followed by injection of Lidocaine. The needles were then removed. Hemostasis was achieved immediately. She tolerated the procedure well and was given discharge instructions and then discharged to home without any complaints or complications.\par \par  normal (ped)...

## 2021-01-06 NOTE — PHYSICAL EXAM
[FreeTextEntry1] : SEEMA is a 65 year female \par Constitutional: healthy appearing, NAD, and normal body habitus\par \par LUMBAR\par ROM: flexion to 30 deg, ext to 5 deg\par \par Gait: normal\par \par Inspection: no erythema, warmth\par Spine: no TTP in spinous process, SI joint, sacrum\par Bony palpation: no TTP in GT\par \par Soft tissue palpation hip: no TTP in gluteus sami, TTP in left glute medius, TTP in left quad\par Soft tissue palpation of spine: no TTP in lumbar paraspinals\par \par 5/5 bilateral HF, KE, DF, PF \par sensation intact in bilat LE\par \par Special tests: neg seated SLR\par \par

## 2021-01-13 ENCOUNTER — APPOINTMENT (OUTPATIENT)
Dept: PHYSICAL MEDICINE AND REHAB | Facility: CLINIC | Age: 66
End: 2021-01-13
Payer: MEDICARE

## 2021-01-13 PROCEDURE — 99214 OFFICE O/P EST MOD 30 MIN: CPT | Mod: 25

## 2021-01-13 PROCEDURE — 20552 NJX 1/MLT TRIGGER POINT 1/2: CPT

## 2021-01-13 NOTE — PROCEDURE
[de-identified] : Trigger Point Tx\par After cleaning with alcohol, sterile needles were inserted into left Ub 25 to 26, trapezius, rhomboid, gluteus max/med, and quad and manipulated intermittently followed by injection of Lidocaine. The needles were then removed. Hemostasis was achieved immediately. She  tolerated the procedure well and was given discharge instructions and then discharged to home without any complaints or complications.\par \par

## 2021-01-13 NOTE — HISTORY OF PRESENT ILLNESS
[FreeTextEntry1] : Location: back pain\par Severity: 4/10\par Duration: over 1 yr\par Aggravating Factors: sitting\par Alleviating Factors: PAULINE\par Associated Symptoms: denies weight loss, fever, chills, change in bowel/bladder habits, redness, warmth, weakness, numbness/tingling, +radiation down left thigh, went to left big toe Sunday\par Prior Studies: MRI\par 10/7/20 left L5 nerve root block\par 10/14/20 left L3, L4, L5 Medial branch block\par 11/4/20 Left SI joint injection\par 11/18/20 left L5 and S1 PAULINE\par 12/11/20 caudal PAULINE \par \par did not tolerate Gabapentin \par \par Neck hurting again lately.  She reports hx of arthritis years ago.  Saw Dr Cain.  Pain with turning.  No pain down arms, numbness.

## 2021-01-13 NOTE — PHYSICAL EXAM
[FreeTextEntry1] : SEEMA is a 65 year female \par Constitutional: healthy appearing, NAD, and normal body habitus\par \par LUMBAR\par ROM: flexion to 30 deg, ext to 5 deg\par \par Gait: normal\par \par Inspection: no erythema, warmth\par Spine: no TTP in spinous process, SI joint, sacrum\par Bony palpation: no TTP in GT\par \par Soft tissue palpation hip: no TTP in gluteus sami, TTP in left glute medius, TTP in left quad\par Soft tissue palpation of spine: no TTP in lumbar paraspinals\par \par 5/5 bilateral HF, KE, DF, PF \par sensation intact in bilat LE\par \par Special tests: neg seated SLR\par \par \par \par \par NECK\par ROM: flexion to 30, ext to 30, rotation to 45 deg to left and 60 to right\par \par Inspection: no erythema, warmth\par Spine: no TTP in spinous process\par Soft tissue palpation: no TTP in cervical paraspinals, TTP in left rhomboids, trapezius\par \par 5/5 bilateral elb flex/ext, WE, finger abd/flex\par sensation intact in bilat UE\par reflexes:  biceps and triceps 0 bilat\par \par \par \par

## 2021-01-20 ENCOUNTER — APPOINTMENT (OUTPATIENT)
Dept: PHYSICAL MEDICINE AND REHAB | Facility: CLINIC | Age: 66
End: 2021-01-20
Payer: MEDICARE

## 2021-01-20 VITALS — TEMPERATURE: 95.5 F

## 2021-01-20 DIAGNOSIS — Z86.39 PERSONAL HISTORY OF OTHER ENDOCRINE, NUTRITIONAL AND METABOLIC DISEASE: ICD-10-CM

## 2021-01-20 DIAGNOSIS — M54.2 CERVICALGIA: ICD-10-CM

## 2021-01-20 PROCEDURE — 20552 NJX 1/MLT TRIGGER POINT 1/2: CPT

## 2021-01-20 PROCEDURE — 99214 OFFICE O/P EST MOD 30 MIN: CPT | Mod: 25

## 2021-01-20 PROCEDURE — 72040 X-RAY EXAM NECK SPINE 2-3 VW: CPT

## 2021-01-20 RX ORDER — LEVOTHYROXINE SODIUM 137 UG/1
TABLET ORAL
Refills: 0 | Status: ACTIVE | COMMUNITY

## 2021-01-20 NOTE — DATA REVIEWED
[Plain X-Rays] : plain X-Rays [FreeTextEntry1] : Office x-rays of the cervical spine AP and lateral show severe DDD diffusely. Staples in anterior neck.

## 2021-01-20 NOTE — PROCEDURE
[de-identified] : Trigger Point Tx\par After cleaning with alcohol, sterile needles were inserted into left Ub 25 to 26, trapezius, rhomboid, infraspinatus, gluteus max/med, and quad and manipulated intermittently followed by injection of Lidocaine. The needles were then removed. Hemostasis was achieved immediately. She tolerated the procedure well and was given discharge instructions and then discharged to home without any complaints or complications.\par

## 2021-01-20 NOTE — PHYSICAL EXAM
[FreeTextEntry1] : SEEMA is a 65 year female \par Constitutional: healthy appearing, NAD, and normal body habitus\par \par LUMBAR\par ROM: flexion to 30 deg, ext to 5 deg\par \par Gait: normal\par \par Inspection: no erythema, warmth\par Spine: no TTP in spinous process, SI joint, sacrum\par Bony palpation: no TTP in GT\par \par Soft tissue palpation hip: no TTP in gluteus sami, TTP in left glute medius, TTP in left quad\par Soft tissue palpation of spine: no TTP in lumbar paraspinals\par \par \par sensation intact in bilat LE\par \par \par NECK\par ROM: flexion to 30, ext to 30, rotation to 45 deg to left and 60 to right\par \par Inspection: no erythema, warmth\par Spine: no TTP in spinous process\par Soft tissue palpation: no TTP in cervical paraspinals, TTP in left rhomboids, infraspinatus, trapezius\par \par

## 2021-01-20 NOTE — HISTORY OF PRESENT ILLNESS
[FreeTextEntry1] : Location: back pain\par Severity: 3/10\par Duration: over 1 yr\par Aggravating Factors: sitting\par Alleviating Factors: PAULINE\par Associated Symptoms: denies weight loss, fever, chills, change in bowel/bladder habits, redness, warmth, weakness, numbness/tingling, +radiation down left thigh, goes to left big toe or lateral foot sometimes\par Prior Studies: MRI\par 10/7/20 left L5 nerve root block\par 10/14/20 left L3, L4, L5 Medial branch block\par 11/4/20 Left SI joint injection\par 11/18/20 left L5 and S1 PAULINE\par 12/11/20 caudal PAULINE \par \par did not tolerate Gabapentin \par \par Neck hurting again lately. She reports hx of arthritis years ago. Saw Dr Cian. Pain with turning. No pain down arms, numbness. \par

## 2021-01-20 NOTE — ASSESSMENT
[FreeTextEntry1] : She does not want MRI cervical yet since had many MRIs in last few months.  Educated that there is no radiation.\par \par Get bands to do rows.

## 2021-01-27 ENCOUNTER — APPOINTMENT (OUTPATIENT)
Dept: PHYSICAL MEDICINE AND REHAB | Facility: CLINIC | Age: 66
End: 2021-01-27
Payer: MEDICARE

## 2021-01-27 VITALS — TEMPERATURE: 95.1 F

## 2021-01-27 PROCEDURE — 99214 OFFICE O/P EST MOD 30 MIN: CPT | Mod: 25

## 2021-01-27 PROCEDURE — 20552 NJX 1/MLT TRIGGER POINT 1/2: CPT

## 2021-01-27 NOTE — PHYSICAL EXAM
[FreeTextEntry1] : SEEMA is a 65 year female \par Constitutional: healthy appearing, NAD, and normal body habitus\par \par \par NECK\par ROM: flexion to 30, ext to 30, rotation to 45 deg to left and 60 to right\par \par Inspection: no erythema, warmth\par Spine: no TTP in spinous process\par Soft tissue palpation: no TTP in cervical paraspinals, TTP in left rhomboids, infraspinatus, trapezius\par \par

## 2021-01-27 NOTE — ASSESSMENT
[FreeTextEntry1] : Massage rhomboids with tennis ball.  Limit looking down.\par \par Taught lateral shoulder raises.  \par \par Xray cervical reviewed again with her.  \par \par See Dr Tejada for possible lumbar surgery.

## 2021-01-27 NOTE — PROCEDURE
[de-identified] : Trigger Point Tx\par After cleaning with alcohol, sterile needles were inserted into left cervical paraspinals, trapezius, infraspinatus, rhomboid and manipulated intermittently followed by injection of Lidocaine. The needles were then removed. Hemostasis was achieved immediately. She  tolerated the procedure well and was given discharge instructions and then discharged to home without any complaints or complications.\par \par

## 2021-01-27 NOTE — HISTORY OF PRESENT ILLNESS
[FreeTextEntry1] : Neck hurting still. She reports hx of arthritis years ago. Saw Dr Cain. Pain with turning. No pain down arms, numbness. \par Imaging: xray 1/2021\par \par Back is still hurting.  Pain with standing.

## 2021-02-03 ENCOUNTER — APPOINTMENT (OUTPATIENT)
Dept: PHYSICAL MEDICINE AND REHAB | Facility: CLINIC | Age: 66
End: 2021-02-03
Payer: MEDICARE

## 2021-02-03 ENCOUNTER — APPOINTMENT (OUTPATIENT)
Dept: ORTHOPEDIC SURGERY | Facility: CLINIC | Age: 66
End: 2021-02-03
Payer: MEDICARE

## 2021-02-03 DIAGNOSIS — M25.552 PAIN IN LEFT HIP: ICD-10-CM

## 2021-02-03 DIAGNOSIS — M77.10 LATERAL EPICONDYLITIS, UNSPECIFIED ELBOW: ICD-10-CM

## 2021-02-03 PROCEDURE — 20552 NJX 1/MLT TRIGGER POINT 1/2: CPT

## 2021-02-03 PROCEDURE — 99214 OFFICE O/P EST MOD 30 MIN: CPT

## 2021-02-03 PROCEDURE — 99214 OFFICE O/P EST MOD 30 MIN: CPT | Mod: 25

## 2021-02-03 NOTE — PROCEDURE
[de-identified] : \par Trigger Point Tx\par After cleaning with alcohol, sterile needles were inserted into  Du 14, and cervical paraspinals, trapezius, rhomboids bilaterally and manipulated intermittently followed by injection of Lidocaine. The needles were then removed. Hemostasis was achieved immediately. She  tolerated the procedure well and was given discharge instructions and then discharged to home without any complaints or complications.\par \par

## 2021-02-03 NOTE — PHYSICAL EXAM
[FreeTextEntry1] : SEEMA is a 65 year female \par Constitutional: healthy appearing, NAD, and normal body habitus\par \par \par NECK\par ROM: flexion to 30, ext to 30, rotation to 45 deg to left and 60 to right\par \par Inspection: no erythema, warmth\par Spine: no TTP in spinous process\par Soft tissue palpation: no TTP in cervical paraspinals, TTP in left rhomboids, infraspinatus, trapezius\par \par hip:\par +FADIR on left

## 2021-02-03 NOTE — HISTORY OF PRESENT ILLNESS
[FreeTextEntry1] : Neck hurting still. She reports hx of arthritis years ago. Saw Dr Cain. Pain with turning. No pain down arms, numbness. \par Imaging: xray 1/2021\par \par Back is still hurting. Pain with standing. \par Xray left hip 9/2020.

## 2021-02-03 NOTE — ASSESSMENT
[FreeTextEntry1] : Does not want spine surgery yet.  Continue HEP.  \par \par Xray left hip reviewed from 9/2020.  There is mild arthritis. Spoke to Dr Tejada and he agrees with a diagnostic left hip injection.  She will call back when he can come in for that.   Dr Tejada will also have her do an EMG.

## 2021-02-04 NOTE — HISTORY OF PRESENT ILLNESS
[de-identified] : 65 year old female with low back pain and left lumbar radiculopathy. She reports the pain radiates into her left gluteal region but it can also radiate into her right gluteal region.  She is a  and has been doing Zoom classes.  Certain movements of her hips increase the pain.  She has a history of hip joint arthritis.  She has not been taking medication regularly for the pain.  She denies recent illness, fevers, numbness, weakness, balance problems, saddle anesthesia, urinary retention or fecal incontinence. She has had spine injections with Dr. Quinonez without relief.  \par

## 2021-02-04 NOTE — PHYSICAL EXAM
[de-identified] : General: No acute distress, conversant, well-nourished.\par Head: Normocephalic, atraumatic\par Neck: trachea midline, FROM\par Heart: normotensive and normal rate and rhythm\par Lungs: No labored breathing\par Skin: No abrasions, no rashes, no edema\par Psych: Alert and oriented to person, place and time\par Extremities: no peripheral edema or digital cyanosis\par Gait: Normal gait. Can perform tandem gait.  \par Vascular: warm and well perfused distally, palpable distal pulses\par \par MSK:\par Left hip: skin intact, no erythema or swelling\par no tenderness to palpation\par mild discomfort at extremes of pROM\par SILT m/ll/1st DWS\par +motor EHL/FHL/TA\par WWP distally, palpable DP and PT pulses\par \par Right hip: skin intact, no erythema or swelling\par no tenderness to palpation\par mild discomfort at extremes of pROM\par SILT m/ll/1st DWS\par +motor EHL/FHL/TA\par WWP distally, palpable DP and PT pulses\par \par Lumbar spine:\par No tenderness to palpation.  No step-off, no deformity.\par \par NEURO EXAM:\par Sensation \par Left L2  -  2/2            \par Left L3  -  2/2\par Left L4  -  2/2\par Left L5  -  2/2\par Left S1  -  2/2\par \par Right L2  -  2/2            \par Right L3  -  2/2\par Right L4  -  2/2\par Right L5  -  2/2\par Right S1  -  2/2\par \par Motor: \par Left L2 (hip flexion)                            5/5                \par Left L3 (knee extension)                   5/5                \par Left L4 (ankle dorsiflexion)                 5/5                \par Left L5 (long toe extensor)                5/5                \par Left S1 (ankle plantar flexion)           5/5\par \par Right L2 (hip flexion)                            5/5                \par Right L3 (knee extension)                   5/5                \par Right L4 (ankle dorsiflexion)                 5/5                \par Right L5 (long toe extensor)                5/5                \par Right S1 (ankle plantar flexion)           5/5\par \par Reflexes: Normal and symmetric\par Negative clonus.  Down-going Babinski.   [de-identified] : Right hip AP and lateral (2 views) radiographs shows no fracture or dislocation.  Mild age-related degenerative changes.\par \par Lumbar MRI (9/23/20):  Left L5-S1 disc herniation impinging left S1 nerve root and with lateral recess stenosis.  Small right foraminal disc herniation.  \par \par Lumbar AP, lateral, flexion and extension radiographs (9/16/20) shows no fracture or dislocation. Lumbar spondylosis with loss of disc height.  L2-L3 mild retrolisthesis.\par \par Left hip AP and lateral radiographs (9/16/20) shows no fracture or dislocation.  Mild age-related degenerative changes.\par \par Lumbar MRI (6/8/19): Small right foraminal disc protrusion L5-S1. Small left extraforaminal disc protrusion at L3-L4.  Small right extraforaminal disc protrusion at L1-L2 with nerve compression.

## 2021-02-04 NOTE — ASSESSMENT
[FreeTextEntry1] : 65 year old female with low back pain and left lumbar radiculopathy. She also has pain radiating to her right gluteal region.  We reviewed last lumbar MRI which shows L5-S1 HNP causing left lateral recess stenosis and impinging left S1 nerve root. There is some right foraminal stenosis. She also has pain with pROM of her hips left worse than right.  She has had minimal relief with spinal injections.  She can consider hip corticosteroid injections which will be therapeutic and diagnostic.  We discussed surgical decompression and discectomy at L5-S1.  This will require her to be no heavy lifting, twisting or bending during her post-op period.  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  She can consider NCS/EMG to evaluate pain.  She will followup in 1-2 months. She knows to call with any questions or concerns or if her symptoms acutely worsen.

## 2021-02-10 ENCOUNTER — APPOINTMENT (OUTPATIENT)
Dept: PHYSICAL MEDICINE AND REHAB | Facility: CLINIC | Age: 66
End: 2021-02-10
Payer: MEDICARE

## 2021-02-10 VITALS — TEMPERATURE: 96.2 F

## 2021-02-10 DIAGNOSIS — M47.812 SPONDYLOSIS W/OUT MYELOPATHY OR RADICULOPATHY, CERVICAL REGION: ICD-10-CM

## 2021-02-10 DIAGNOSIS — M79.672 PAIN IN LEFT FOOT: ICD-10-CM

## 2021-02-10 PROCEDURE — 99213 OFFICE O/P EST LOW 20 MIN: CPT | Mod: 25

## 2021-02-10 PROCEDURE — 20552 NJX 1/MLT TRIGGER POINT 1/2: CPT

## 2021-02-10 NOTE — HISTORY OF PRESENT ILLNESS
[FreeTextEntry1] : Neck hurting still. She reports hx of arthritis years ago. Saw Dr Cain. Pain with turning. No pain down arms, numbness. \par Imaging: xray 1/2021

## 2021-02-10 NOTE — ASSESSMENT
[FreeTextEntry1] : Will do left hip injection next wk.\par \par Seeing Neurologist soon for LE EMG.\par \par See vascular if toe pulses again.  Dr Tejada recommended getting foot xray.  It is mostly likely from left L5 impingement.

## 2021-02-10 NOTE — PROCEDURE
[de-identified] : Trigger Point Tx\par After cleaning with alcohol, sterile needles were inserted into Du 14, and left cervical paraspinals, rhomboids, and infraspinatus and manipulated intermittently followed by injection of Lidocaine. The needles were then removed. Hemostasis was achieved immediately. She  tolerated the procedure well and was given discharge instructions and then discharged to home without any complaints or complications.\par \par

## 2021-02-16 ENCOUNTER — APPOINTMENT (OUTPATIENT)
Dept: PHYSICAL MEDICINE AND REHAB | Facility: CLINIC | Age: 66
End: 2021-02-16
Payer: MEDICARE

## 2021-02-16 PROCEDURE — 27093 INJECTION FOR HIP X-RAY: CPT | Mod: LT

## 2021-02-16 PROCEDURE — 77002 NEEDLE LOCALIZATION BY XRAY: CPT

## 2021-02-24 ENCOUNTER — APPOINTMENT (OUTPATIENT)
Dept: PHYSICAL MEDICINE AND REHAB | Facility: CLINIC | Age: 66
End: 2021-02-24
Payer: MEDICARE

## 2021-02-24 VITALS
HEART RATE: 69 BPM | BODY MASS INDEX: 16.83 KG/M2 | HEIGHT: 63 IN | RESPIRATION RATE: 18 BRPM | WEIGHT: 95 LBS | TEMPERATURE: 97.6 F | DIASTOLIC BLOOD PRESSURE: 80 MMHG | SYSTOLIC BLOOD PRESSURE: 113 MMHG | OXYGEN SATURATION: 96 %

## 2021-02-24 VITALS
DIASTOLIC BLOOD PRESSURE: 80 MMHG | TEMPERATURE: 97 F | HEIGHT: 63 IN | WEIGHT: 95 LBS | BODY MASS INDEX: 16.83 KG/M2 | SYSTOLIC BLOOD PRESSURE: 113 MMHG

## 2021-02-24 DIAGNOSIS — G89.29 LUMBAGO WITH SCIATICA, LEFT SIDE: ICD-10-CM

## 2021-02-24 DIAGNOSIS — G57.02 LESION OF SCIATIC NERVE, LEFT LOWER LIMB: ICD-10-CM

## 2021-02-24 DIAGNOSIS — M54.42 LUMBAGO WITH SCIATICA, LEFT SIDE: ICD-10-CM

## 2021-02-24 PROCEDURE — 99214 OFFICE O/P EST MOD 30 MIN: CPT

## 2021-02-24 NOTE — HISTORY OF PRESENT ILLNESS
[FreeTextEntry1] : Ms. SEEMA CORREA is a very pleasant 66 year female seen for evaluation of progressing  low back pain despite many injections PAULINE/  PT /meds and hip injections  and continues to  radiate  to the left  lower extremity that has been ongoing for 2 years   without any specific injury or inciting event\par  she is a  \par some days she cries with the pain \par nothing has helped long term \par her MRI is abnormal + disc \par . The pain is located primarily left piriformis and down lateral left thigh  intermittent in nature and described as sharp . The pain is rated as 5/10 during today's visit, and ranges from 1-8/10. 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 The patient denies any numbness/tingling, weakness, or bowel/bladder dysfunction. The patient has  other complaints at this time sharp R big toe pain seeing podiatrist -i do not think this is referred but OA toe from point work in past \par .\par

## 2021-02-24 NOTE — REVIEW OF SYSTEMS
[Patient Intake Form Reviewed] : Patient intake form was reviewed [Joint Pain] : joint pain [Joint Stiffness] : joint stiffness [Muscle Pain] : muscle pain [Negative] : Heme/Lymph [Lower Ext Edema] : no lower extremity edema [Muscle Weakness] : no muscle weakness

## 2021-02-24 NOTE — DATA REVIEWED
[MRI] : MRI [FreeTextEntry1] : no MRI hip only xray s show mild oA \par MRI LS spine left L5-S1 disc impingement S1 nerve root and lat recess

## 2021-02-24 NOTE — ASSESSMENT
[FreeTextEntry1] : \par PLAN AND RECOMMENDATIONS :\par \par We discussed differential diagnosis and clinical impression\par she may have labral issues with her hip or piriformis entrapment \par we discussed MRI hip as she only has xrays \par agree with EMG consider pre op planning \par pain generator ? \par \par Recommend\par .symptomatic care and support\par  medications cont as per pain \par  imaging hip MRI \par \par  hydrotherapy /heat / cold for pain\par  continue  spinal precautions including care with bending, lifting, twisting and  carrying.\par \par  relative rest and avoidance of painful activity where possible \par  increasing activity as discussed \par  return for EMG \par Information given to patient about EMG and Nerve Conduction Study Examination including  planning, differential diagnosis to rule in /rule out ,duration of the test ,precautions (if patient on blood thinner.has bleeding disorder or  pace maker device etc -still possible to undergo with care), side effects(benign-limited to short term bruising and discomfort/pain)  \par The protocol of temp checks upon arrival ,disinfection procedure of waiting room and the lab explained- reassured. \par All questions answered. \par Patient instructed to book appointment upon conclusion of appointment\par \par Information sheet ' Answers to your Questions on EMG " forwarded to patient to read prior to testing, with further information about training,background and the procedure itself .\par Time spent for patient care included  :\par \par coordination of a treatment plan with patient agreeable to such and understanding why hip rule out important \par \par discussion of the differential diagnosis -why important to rule out certain conditions \par \par \par patient education \par \par  \par red warning flags if certain symptoms/signs arise numbness weakness foot drop \par realistic goals given age and other medical comorbidities \par review of documents reports all scans \par communication to referring provider or PCP re status of patient and medical opinion verbally by cell phone or in \par \par  I certify that the time spent in total,for encounter was 45 minutes \par ref provider will be called after EMG to discuss results \par \par \par

## 2021-03-03 ENCOUNTER — APPOINTMENT (OUTPATIENT)
Dept: PHYSICAL MEDICINE AND REHAB | Facility: CLINIC | Age: 66
End: 2021-03-03
Payer: MEDICARE

## 2021-03-03 VITALS
HEART RATE: 61 BPM | DIASTOLIC BLOOD PRESSURE: 81 MMHG | WEIGHT: 95 LBS | RESPIRATION RATE: 17 BRPM | BODY MASS INDEX: 16.83 KG/M2 | HEIGHT: 63 IN | SYSTOLIC BLOOD PRESSURE: 115 MMHG

## 2021-03-03 VITALS — TEMPERATURE: 95.5 F

## 2021-03-03 DIAGNOSIS — M51.26 OTHER INTERVERTEBRAL DISC DISPLACEMENT, LUMBAR REGION: ICD-10-CM

## 2021-03-03 DIAGNOSIS — R20.2 ANESTHESIA OF SKIN: ICD-10-CM

## 2021-03-03 DIAGNOSIS — R20.0 ANESTHESIA OF SKIN: ICD-10-CM

## 2021-03-03 DIAGNOSIS — M79.18 MYALGIA, OTHER SITE: ICD-10-CM

## 2021-03-03 PROCEDURE — 20552 NJX 1/MLT TRIGGER POINT 1/2: CPT

## 2021-03-03 PROCEDURE — 95886 MUSC TEST DONE W/N TEST COMP: CPT

## 2021-03-03 PROCEDURE — 95911 NRV CNDJ TEST 9-10 STUDIES: CPT

## 2021-03-03 PROCEDURE — 99214 OFFICE O/P EST MOD 30 MIN: CPT | Mod: 25

## 2021-03-03 NOTE — HISTORY OF PRESENT ILLNESS
[FreeTextEntry1] : Location: back and hip pain\par Severity: 6/10\par Duration: over 1 yr\par Aggravating Factors: sitting\par Alleviating Factors: PAULINE\par Associated Symptoms: denies weight loss, fever, chills, change in bowel/bladder habits, redness, warmth, weakness, numbness/tingling, +radiation down left thigh, goes to left big toe or lateral foot sometimes\par Prior Studies: MRI\par 10/7/20 left L5 nerve root block\par 10/14/20 left L3, L4, L5 Medial branch block\par 11/4/20 Left SI joint injection\par 11/18/20 left L5 and S1 PAULINE\par 12/11/20 caudal PAULINE \par 2/2021 left hip injection

## 2021-03-03 NOTE — PHYSICAL EXAM
[FreeTextEntry1] : SEEMA is a 66 year female \par Constitutional: healthy appearing, NAD, and normal body habitus\par \par LUMBAR\par ROM: flexion to 30 deg, ext to 5 deg\par \par Gait: normal\par \par Inspection: no erythema, warmth\par Spine: no TTP in spinous process, SI joint, sacrum\par Bony palpation: no TTP in GT\par \par Soft tissue palpation hip:  TTP in left gluteus sami, TTP in left TFL\par Soft tissue palpation of spine: no TTP in lumbar paraspinals\par \par poor balance on left leg\par sensation intact in bilat LE\par

## 2021-03-03 NOTE — ASSESSMENT
[FreeTextEntry1] : EMG reviewed with her.  Lumbar radiculopathy based on paraspinals so not likely true radiculopathy. \par \par MRI hip reviewed with her in detail - showed her images as well.\par \par Will talk to Dr Tejada about possible surgery.  He recommends nerve root block. \par \par Use bar when standing on left leg when doing ballet. Work on balance.

## 2021-03-03 NOTE — PROCEDURE
[de-identified] : After informed consent,she elected to proceed with a trigger point injection into the  left TFL and gluteus sami. I confirmed no prior adverse reactions, no active infections, and no relevant allergies. \par  \par The skin was prepped in the usual sterile manner.  The sites were injected with local anesthetic followed by local needling. The injection was completed without complication and a bandage was applied. She tolerated the procedure well and was given post-injection instructions. \par  \par Cold Tx x 48 hours, analgesics prn. Medications: 0.5 ml of 1% Lidocaine per site\par \par \par

## 2021-03-05 ENCOUNTER — APPOINTMENT (OUTPATIENT)
Dept: ORTHOPEDIC SURGERY | Facility: CLINIC | Age: 66
End: 2021-03-05
Payer: MEDICARE

## 2021-03-05 DIAGNOSIS — M24.159 OTHER ARTICULAR CARTILAGE DISORDERS, UNSPECIFIED HIP: ICD-10-CM

## 2021-03-05 DIAGNOSIS — M76.899 OTHER SPECIFIED ENTHESOPATHIES OF UNSPECIFIED LOWER LIMB, EXCLUDING FOOT: ICD-10-CM

## 2021-03-05 DIAGNOSIS — M16.12 UNILATERAL PRIMARY OSTEOARTHRITIS, LEFT HIP: ICD-10-CM

## 2021-03-05 PROCEDURE — 99214 OFFICE O/P EST MOD 30 MIN: CPT | Mod: 95

## 2021-03-05 NOTE — HISTORY OF PRESENT ILLNESS
[de-identified] : This visit was provided by the [employer] service.  This telehealth appointment was conducted using real-time 2-way audiovisual technology.  The patient Ailyn Chow was at her home during the time of the visit. The provider, Ronaldo Tejada was located at his office at [address] [place] [address] at the time of the visit.  The patient, the patient's  and Ronaldo Tejada participated in the telehealth encounter.  Verbal consent was given on March 5, 2021 by the patient.\par \par HPI:\par Telehealth via [employer] telemedicine service: 30 minutes\par \par 66 year old female with left hip pain.  She says she feels the pain most when she is standing.  She says the pain is predominantly on the left side but at times she feels a similar pain on the right.  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  She recently had a trigger point injection by Dr. Quinonez which provided her great relief but the symptoms have returned.  She denies recent illness, fevers, numbness, weakness, balance problems, saddle anesthesia, urinary retention or fecal incontinence.  She is here to review her hip MRI.

## 2021-03-05 NOTE — PHYSICAL EXAM
[de-identified] : General: NAD AAOx4, well-appearing\par Respiratory: No visible respiratory distress\par Psych: Good mood and appropriate affect\par Skin: WWP, no rashes\par Gait: Normal gait, can do tandem gait\par MSK:\par Lumbar spine: no visible deformity in coronal or sagittal planes\par Neuro: patient denies numbness, grossly moving all extremities\par Hip: patient indicates pain in posterior hip area [de-identified] : Left hip MRI (2/26/21): 1. Labral degeneration and tearing at the chondral labral junction from 1 o'clock to 11 o'clock.\par 2. Small hip joint effusion.\par 3. Mild-to-moderate left common hamstring tendinosis.\par 4. Findings consistent with ischiofemoral impingement with intramuscular edema seen within the quadratus femoris.\par \par Right hip AP and lateral (2 views) radiographs shows no fracture or dislocation.  Mild age-related degenerative changes.\par \par Lumbar MRI (9/23/20):  Left L5-S1 disc herniation impinging left S1 nerve root and with lateral recess stenosis.  Small right foraminal disc herniation.  \par \par Lumbar AP, lateral, flexion and extension radiographs (9/16/20) shows no fracture or dislocation. Lumbar spondylosis with loss of disc height.  L2-L3 mild retrolisthesis.\par \par Left hip AP and lateral radiographs (9/16/20) shows no fracture or dislocation.  Mild age-related degenerative changes.\par \par Lumbar MRI (6/8/19): Small right foraminal disc protrusion L5-S1. Small left extraforaminal disc protrusion at L3-L4.  Small right extraforaminal disc protrusion at L1-L2 with nerve compression.

## 2021-03-05 NOTE — ASSESSMENT
[FreeTextEntry1] : 65 year old female with pain in her posterior left hip.  She will have rare but similar pain in her right hip.  We reviewed her recent left hip MRI and discussed the findings of left hip arthritis, labral degeneration, proximal hamstring tendinitis and ischiofemoral impingement.  We also discussed her NCS/EMG which showed chronic left L5 radiculopathy.  The patient does have left L5-S1 disc herniation.  We discussed lumbar decompression and discectomy. We discussed the risks and benefits of surgery.  I do think she would benefit from this surgery but it is not guaranteed to completely relieve her pain. I recommended she see a hip specialist.  She can also consider doing another left L5-S1 selective nerve root block (she had one in the past but does not recall her experience with it but according to the notes her pain went from a 5 to a 2).  She is planning to do physical therapy. 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  She is aware the post-op restrictions after surgery would be 6 weeks of no heavy lifting >10 lbs, twisting or bending.  She will consider her options and will followup with me after she sees a hip specialist.  She knows to call with any questions or concerns or if her symptoms acutely worsen.

## 2021-03-10 ENCOUNTER — APPOINTMENT (OUTPATIENT)
Dept: ORTHOPEDIC SURGERY | Facility: CLINIC | Age: 66
End: 2021-03-10
Payer: MEDICARE

## 2021-03-10 DIAGNOSIS — M54.17 RADICULOPATHY, LUMBOSACRAL REGION: ICD-10-CM

## 2021-03-10 DIAGNOSIS — M54.16 RADICULOPATHY, LUMBAR REGION: ICD-10-CM

## 2021-03-10 PROCEDURE — 99214 OFFICE O/P EST MOD 30 MIN: CPT

## 2021-03-10 NOTE — PHYSICAL EXAM
[de-identified] : Lumbar back\par \par Constitutional: \par The patient is healthy-appearing and in no apparent distress. \par \par Gait and Station: \par The patient ambulates with a normal gait, no limp. \par \par Cardiovascular System: \par There is bilateral lower extremity capillary refill less than 2 seconds. \par \par Skin: \par There are no skin abnormalities of the lumbar spine.\par \par Lumbar Spine: \par \par Inspection: \par There is no induration, ecchymosis, or swelling. \par \par Bony Palpation: \par There is no tenderness of the spinous processes.\par There is no tenderness of the iliac crest.\par There is no tenderness of either ASIS.\par There is no tenderness of either PSIS\par There is no tenderness of the greater trochanters.\par There is no tenderness of the right SI joint.\par There is no tenderness of the left SI joint. \par \par Soft Tissue Palpation:\par There is tenderness of the paraspinal region at L4/5. \par  \par Active Range of Motion: \par There is normal lateral flexion and rotation. \par \par Motor Strength: \par There is 5/5 hip flexion, knee extension and flexion, ankle dorsiflexion and plantarflexion.\par \par Neurological System: \par There is normal sensation to light touch on bilateral lower extremities.\par There is a negative supine straight leg raising test.\par There is a negative seated straight leg raising test.  \par There is no clonus. \par \par Psychiatric: \par The patient demonstrates a normal mood and affect and is active and alert.\par Left hip\par \par Cardiovascular System: \par There is capillary refill less than 2 seconds. \par \par Skin: \par There is no skin abnormalities.\par \par \par Left Hips: \par \par Bony Palpation: \par There is no tenderness of the iliac crest.\par There is no tenderness of the ASIS.\par There is no tenderness of the PSIS.\par There is no tenderness of the SI joint.\par There is no tenderness of the greater trochanter. \par \par Soft Tissue Palpation: \par There is no tenderness of the hip adductors.\par There is no tenderness of the hip abductors.\par There is no tenderness of the hamstrings. \par There is no tenderness of the piriformis. \par There is no tenderness of the hip flexors.\par \par Active Range of Motion: \par There is full range of motion at the hip actively and passively. \par \par Special Tests: \par There is a negative Lorrie's test.\par There is a negative Johnathan-Caryl test. \par \par Strength: \par There is 5/5 hip flexion, adduction, abduction.  \par \par  [de-identified] : MRI of lumbar spine and MRI of hip - as noted in chart

## 2021-03-10 NOTE — ASSESSMENT
[FreeTextEntry1] : Discussed at length with patient history and exam as well as imaging.  Discussed symptoms c/w lumbar origin and consideration given her reported symptoms to surgery given failure of nonoperative treatments to provide relief.

## 2021-03-10 NOTE — HISTORY OF PRESENT ILLNESS
[de-identified] : Patient is a new patient to me presenting with chronic lumbar back pain with intermittent radiation to the most lateral aspect of the hip she denies any deep medial groin pain.  Denies any fall or trauma.  Prior notes reviewed by Dr. Quinonez and Dr. Tejada.  Denies weakness denies paresthesias.

## 2021-09-29 ENCOUNTER — APPOINTMENT (OUTPATIENT)
Dept: ORTHOPEDIC SURGERY | Facility: CLINIC | Age: 66
End: 2021-09-29
Payer: MEDICARE

## 2021-09-29 DIAGNOSIS — M76.899 OTHER SPECIFIED ENTHESOPATHIES OF UNSPECIFIED LOWER LIMB, EXCLUDING FOOT: ICD-10-CM

## 2021-09-29 PROCEDURE — 20551 NJX 1 TENDON ORIGIN/INSJ: CPT

## 2021-09-29 PROCEDURE — 99213 OFFICE O/P EST LOW 20 MIN: CPT | Mod: 25

## 2021-09-29 NOTE — PROCEDURE
[de-identified] : Patient has demonstrated limited relief from NSAIDS, rest, exercises / PT, and after discussion of the risks and benefits, the patient has elected to proceed with a corticosteroid injection into the LEFT hip abductors just anterior to GT bursa.\par Confirmed that the patient does not have history of prior adverse reactions, active, infections, or relevant allergies.   There was no erythema or warmth, and the skin was clear.  The skin was sterilized with alcohol and via sterile technique, the area with 3 cc of 1% xylocaine and 5mg of Kenalog.  The injection was completed without complication and a bandage was applied.  The patient tolerated the procedure well and was given post-injection instructions.

## 2021-09-29 NOTE — PHYSICAL EXAM
[de-identified] : Lumbar back\par \par Constitutional: \par The patient is healthy-appearing and in no apparent distress. \par \par Gait and Station: \par The patient ambulates with a normal gait, no limp. \par \par Cardiovascular System: \par There is bilateral lower extremity capillary refill less than 2 seconds. \par \par Skin: \par There are no skin abnormalities of the lumbar spine.\par \par Lumbar Spine: \par \par Inspection: \par There is no induration, ecchymosis, or swelling. \par \par Bony Palpation: \par There is no tenderness of the spinous processes.\par There is no tenderness of the iliac crest.\par There is no tenderness of either ASIS.\par There is no tenderness of either PSIS\par There is no tenderness of the greater trochanters.\par There is no tenderness of the right SI joint.\par There is no tenderness of the left SI joint. \par \par Soft Tissue Palpation:\par There is tenderness of the paraspinal region at L4/5. \par  \par Active Range of Motion: \par There is normal lateral flexion and rotation. \par \par Motor Strength: \par There is 5/5 hip flexion, knee extension and flexion, ankle dorsiflexion and plantarflexion.\par \par Neurological System: \par There is normal sensation to light touch on bilateral lower extremities.\par There is a negative supine straight leg raising test.\par There is a negative seated straight leg raising test.  \par There is no clonus. \par \par Psychiatric: \par The patient demonstrates a normal mood and affect and is active and alert.\par Left hip\par \par Cardiovascular System: \par There is capillary refill less than 2 seconds. \par \par Skin: \par There is no skin abnormalities.\par \par \par Left Hips: \par \par Bony Palpation: \par There is no tenderness of the iliac crest.\par There is no tenderness of the ASIS.\par There is no tenderness of the PSIS.\par There is no tenderness of the SI joint.\par There is no tenderness of the greater trochanter. \par \par Soft Tissue Palpation: \par There is no tenderness of the hip adductors.\par There is mild tenderness of the hip abductors.\par There is no tenderness of the hamstrings. \par There is no tenderness of the piriformis. \par There is no tenderness of the hip flexors.\par \par Active Range of Motion: \par There is full range of motion at the hip actively and passively. \par \par Special Tests: \par There is a positive Lorrie's test.\par There is a negative Johnathan-Caryl test. \par \par Strength: \par There is 5/5 hip flexion, adduction, abduction.  \par \par

## 2021-09-29 NOTE — ASSESSMENT
[FreeTextEntry1] : Discussed at length with patient exam consistent with hip abductor tightness mild bursitis and I do not think at this time given the exam and reported location that there is any symptomatic labral pathology.  Patient like to continue physical therapy and prescription given in regards to stretching.  If persistent discomfort consideration to intra-articular cortisone injection

## 2021-09-29 NOTE — HISTORY OF PRESENT ILLNESS
[de-identified] : Patient is an established patient last seen back in March.  The time she was having predominantly lumbar back issues with radiation to the anterior aspect of the hip but anterior lateral and anterior medial.  She did undergo surgery with a discectomy 2 months ago with a spine surgeon [facility].  She states overall the pain has improved significantly but over the last 2 weeks she said while being in physical therapy some anterior lateral groin discomfort.  She denies any recent fall or trauma

## 2022-02-23 ENCOUNTER — APPOINTMENT (OUTPATIENT)
Dept: ORTHOPEDIC SURGERY | Facility: CLINIC | Age: 67
End: 2022-02-23
Payer: MEDICARE

## 2022-02-23 VITALS — WEIGHT: 94 LBS | HEIGHT: 63 IN | BODY MASS INDEX: 16.66 KG/M2

## 2022-02-23 DIAGNOSIS — M77.11 LATERAL EPICONDYLITIS, RIGHT ELBOW: ICD-10-CM

## 2022-02-23 DIAGNOSIS — M77.12 LATERAL EPICONDYLITIS, LEFT ELBOW: ICD-10-CM

## 2022-02-23 PROCEDURE — 20605 DRAIN/INJ JOINT/BURSA W/O US: CPT | Mod: LT

## 2022-02-23 PROCEDURE — 99213 OFFICE O/P EST LOW 20 MIN: CPT | Mod: 25

## 2022-02-23 NOTE — PROCEDURE
[de-identified] : After discussion of the risks and benefits, the patient has elected to proceed with an injection. Confirmed that the patient does not have a history of prior adverse reactions, active infections are relevant allergies. There was no erythema, warmth and the skin was clear. The skin was sterilized with alcohol. Ethyl chloride was used as a topical anesthetic. A needle was inserted. The site was injected with a mixture of Kenalog and local anesthetic. The injection was completed without complication and a bandage was applied. The patient tolerated the procedure well and was given post injection instructions.\par \par Medications:left lateral epicondyle injection with 10 mg of Kenalog and 2 cc 1% lidocaine

## 2022-02-23 NOTE — PHYSICAL EXAM
[de-identified] : Left elbow shows no warmth or swelling. She has some tenderness of the lateral epicondyle and pain with resisted extension of the wrist and digits with a full range of motion. Neurovascular exam is normal.\par \par Right elbow shows mild tenderness over the lateral epicondyle with full range of motion no pain with resisted extension of the wrist and digits neurovascular exam is normal

## 2022-02-23 NOTE — DISCUSSION/SUMMARY
[Medication Risks Reviewed] : Medication risks reviewed [de-identified] : We discussed multiple treatment options. Cortisone injection was given over the left lateral epicondyle. The patient tolerated. She'll do some physical therapy. She'll apply ice and heat. Over-the-counter medication. She can do her home exercises. Symptoms don't improve she'll let me know followup will be as needed.

## 2022-02-23 NOTE — HISTORY OF PRESENT ILLNESS
[de-identified] : She has had pain in her left elbow for about a year. 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 No numbness or tingling patient's chronic neck problem pierced the chronic pain in her scapula.

## 2022-10-20 ENCOUNTER — APPOINTMENT (OUTPATIENT)
Dept: HEART AND VASCULAR | Facility: CLINIC | Age: 67
End: 2022-10-20

## 2024-01-30 NOTE — PHYSICAL EXAM
[Normal] : Oriented to person, place, and time, insight and judgement were intact and the affect was normal [FreeTextEntry1] : \par  [de-identified] : No gross deformity, TTP of the lumbar paraspinals: bilaterally, ROM limited with flexion, SLR positive: left Yes